# Patient Record
Sex: FEMALE
[De-identification: names, ages, dates, MRNs, and addresses within clinical notes are randomized per-mention and may not be internally consistent; named-entity substitution may affect disease eponyms.]

---

## 2019-12-10 ENCOUNTER — HOSPITAL ENCOUNTER (EMERGENCY)
Dept: HOSPITAL 5 - ED | Age: 84
Discharge: HOME | End: 2019-12-10
Payer: MEDICARE

## 2019-12-10 VITALS — DIASTOLIC BLOOD PRESSURE: 57 MMHG | SYSTOLIC BLOOD PRESSURE: 136 MMHG
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
## 2019-12-10 DIAGNOSIS — Z79.1: ICD-10-CM
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
## 2019-12-10 DIAGNOSIS — Z79.899: ICD-10-CM

## 2019-12-10 DIAGNOSIS — Z98.890: ICD-10-CM

## 2019-12-10 DIAGNOSIS — S22.31XA: Primary | ICD-10-CM

## 2019-12-10 DIAGNOSIS — I21.9: ICD-10-CM

## 2019-12-10 DIAGNOSIS — I10: ICD-10-CM

## 2019-12-10 PROCEDURE — 93005 ELECTROCARDIOGRAM TRACING: CPT

## 2019-12-10 PROCEDURE — 93010 ELECTROCARDIOGRAM REPORT: CPT

## 2019-12-10 PROCEDURE — 96372 THER/PROPH/DIAG INJ SC/IM: CPT

## 2019-12-10 PROCEDURE — 99283 EMERGENCY DEPT VISIT LOW MDM: CPT

## 2019-12-10 PROCEDURE — 71046 X-RAY EXAM CHEST 2 VIEWS: CPT
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
